# Patient Record
Sex: FEMALE | Race: WHITE
[De-identification: names, ages, dates, MRNs, and addresses within clinical notes are randomized per-mention and may not be internally consistent; named-entity substitution may affect disease eponyms.]

---

## 2018-03-30 ENCOUNTER — HOSPITAL ENCOUNTER (EMERGENCY)
Dept: HOSPITAL 91 - E/R | Age: 83
Discharge: HOME | End: 2018-03-30
Payer: COMMERCIAL

## 2018-03-30 ENCOUNTER — HOSPITAL ENCOUNTER (EMERGENCY)
Age: 83
Discharge: HOME | End: 2018-03-30

## 2018-03-30 DIAGNOSIS — I10: ICD-10-CM

## 2018-03-30 DIAGNOSIS — E66.9: ICD-10-CM

## 2018-03-30 DIAGNOSIS — M54.32: Primary | ICD-10-CM

## 2018-03-30 PROCEDURE — 96372 THER/PROPH/DIAG INJ SC/IM: CPT

## 2018-03-30 PROCEDURE — 99284 EMERGENCY DEPT VISIT MOD MDM: CPT

## 2018-03-30 RX ADMIN — KETOROLAC TROMETHAMINE 1 MG: 30 INJECTION, SOLUTION INTRAMUSCULAR at 17:56

## 2018-03-30 RX ADMIN — ALPRAZOLAM 1 MG: 1 TABLET ORAL at 18:01

## 2019-03-19 ENCOUNTER — HOSPITAL ENCOUNTER (EMERGENCY)
Dept: HOSPITAL 10 - E/R | Age: 84
Discharge: HOME | End: 2019-03-19
Payer: COMMERCIAL

## 2019-03-19 ENCOUNTER — HOSPITAL ENCOUNTER (EMERGENCY)
Dept: HOSPITAL 91 - E/R | Age: 84
Discharge: HOME | End: 2019-03-19
Payer: COMMERCIAL

## 2019-03-19 VITALS
HEIGHT: 62 IN | BODY MASS INDEX: 24.34 KG/M2 | HEIGHT: 62 IN | WEIGHT: 132.28 LBS | BODY MASS INDEX: 24.34 KG/M2 | WEIGHT: 132.28 LBS

## 2019-03-19 VITALS — HEART RATE: 68 BPM | DIASTOLIC BLOOD PRESSURE: 69 MMHG | SYSTOLIC BLOOD PRESSURE: 139 MMHG | RESPIRATION RATE: 18 BRPM

## 2019-03-19 DIAGNOSIS — M54.2: Primary | ICD-10-CM

## 2019-03-19 DIAGNOSIS — R94.02: ICD-10-CM

## 2019-03-19 DIAGNOSIS — I10: ICD-10-CM

## 2019-03-19 LAB
ADD MAN DIFF?: NO
ANION GAP: 10 (ref 5–13)
BASOPHIL #: 0 10^3/UL (ref 0–0.1)
BASOPHILS %: 0.4 % (ref 0–2)
BLOOD UREA NITROGEN: 15 MG/DL (ref 7–20)
CALCIUM: 10.8 MG/DL (ref 8.4–10.2)
CARBON DIOXIDE: 31 MMOL/L (ref 21–31)
CHLORIDE: 96 MMOL/L (ref 97–110)
CREATININE: 0.83 MG/DL (ref 0.44–1)
EOSINOPHILS #: 0.1 10^3/UL (ref 0–0.5)
EOSINOPHILS %: 1.2 % (ref 0–7)
GLUCOSE: 108 MG/DL (ref 70–220)
HEMATOCRIT: 45.9 % (ref 37–47)
HEMOGLOBIN: 14.1 G/DL (ref 12–16)
IMMATURE GRANS #M: 0.01 10^3/UL (ref 0–0.03)
IMMATURE GRANS % (M): 0.2 % (ref 0–0.43)
LYMPHOCYTES #: 2.1 10^3/UL (ref 0.8–2.9)
LYMPHOCYTES %: 43.1 % (ref 15–51)
MEAN CORPUSCULAR HEMOGLOBIN: 26.4 PG (ref 29–33)
MEAN CORPUSCULAR HGB CONC: 30.7 G/DL (ref 32–37)
MEAN CORPUSCULAR VOLUME: 86 FL (ref 82–101)
MEAN PLATELET VOLUME: 9.5 FL (ref 7.4–10.4)
MONOCYTE #: 0.5 10^3/UL (ref 0.3–0.9)
MONOCYTES %: 9.6 % (ref 0–11)
NEUTROPHIL #: 2.2 10^3/UL (ref 1.6–7.5)
NEUTROPHILS %: 45.5 % (ref 39–77)
NUCLEATED RED BLOOD CELLS #: 0 10^3/UL (ref 0–0)
NUCLEATED RED BLOOD CELLS%: 0 /100WBC (ref 0–0)
PLATELET COUNT: 227 10^3/UL (ref 140–415)
POTASSIUM: 3.9 MMOL/L (ref 3.5–5.1)
RED BLOOD COUNT: 5.34 10^6/UL (ref 4.2–5.4)
RED CELL DISTRIBUTION WIDTH: 13.9 % (ref 11.5–14.5)
SODIUM: 137 MMOL/L (ref 135–144)
TROPONIN-I: < 0.012 NG/ML (ref 0–0.12)
WHITE BLOOD COUNT: 4.9 10^3/UL (ref 4.8–10.8)

## 2019-03-19 PROCEDURE — 96374 THER/PROPH/DIAG INJ IV PUSH: CPT

## 2019-03-19 PROCEDURE — 36415 COLL VENOUS BLD VENIPUNCTURE: CPT

## 2019-03-19 PROCEDURE — 93005 ELECTROCARDIOGRAM TRACING: CPT

## 2019-03-19 PROCEDURE — 80048 BASIC METABOLIC PNL TOTAL CA: CPT

## 2019-03-19 PROCEDURE — 84484 ASSAY OF TROPONIN QUANT: CPT

## 2019-03-19 PROCEDURE — 93880 EXTRACRANIAL BILAT STUDY: CPT

## 2019-03-19 PROCEDURE — 70450 CT HEAD/BRAIN W/O DYE: CPT

## 2019-03-19 PROCEDURE — 85025 COMPLETE CBC W/AUTO DIFF WBC: CPT

## 2019-03-19 PROCEDURE — 99285 EMERGENCY DEPT VISIT HI MDM: CPT

## 2019-03-19 RX ADMIN — KETOROLAC TROMETHAMINE 1 MG: 30 INJECTION, SOLUTION INTRAMUSCULAR at 20:22

## 2019-03-19 NOTE — ERD
ER Documentation


Chief Complaint


Chief Complaint





RIGHT SIDED NECK PAIN X 2 WEEKS.





HPI


Patient is an 89-year-old female with hypertension who presents with neck pain. 


The patient has left-sided neck pain that is worse with range of motion.  She 


has had the symptoms for the past 2 weeks.  She tried ibuprofen and Tylenol.  


She has no fevers.  Upon review of old medical records this is the patient's 


third visit to the ER since 2014.  She does have a primary doctor.





ROS


All systems reviewed and are negative except as per history of present illness.





Medications


Home Meds


Active Scripts


Tramadol HCl (Tramadol HCl) 50 Mg Tablet, 50 MG PO Q6 PRN for PAIN, #20 TAB


   Prov:KUSH CALDERON MD         3/19/19


Reported Medications


Calcium Carbonate (CALCIUM) Unknown Strength Tablet, 1 TAB PO DAILY, TAB


   3/19/19


Folic Acid/Mv,Fe,Other Min (Centrum Complete Multivit Tab) 1 Each Tablet, 1 EACH


PO DAILY, TAB


   3/19/19


Famotidine* (Famotidine*) 20 Mg Tablet, 20 MG PO DAILY, #30 TAB


   3/19/19


Lisinopril* (Lisinopril*) 30 Mg Tablet, 30 MG PO DAILY, #30 TAB


   3/19/19


Amlodipine Besylate* (Amlodipine Besylate*) 10 Mg Tablet, 10 MG PO DAILY, #30 


TAB


   3/19/19


Alendronate Sodium* (Alendronate Sodium*) 35 Mg Tablet, 35 MG PO Q MON, #4 TAB


   3/19/19


Discontinued Reported Medications


Alendronate Sodium* (Fosamax*) 70 Mg Tablet, 70 MG PO QMOND, #4 TAB


   3/30/18


Amlodipine Besylate* (Amlodipine Besylate*) 10 Mg Tablet, 10 MG PO DAILY, #30 


TAB


   3/30/18


Lisinopril* (Lisinopril*) 30 Mg Tablet, 30 MG PO DAILY, #30 TAB


   3/30/18


Discontinued Scripts


Ibuprofen* (Ibuprofen*) 600 Mg Tablet, 600 MG PO Q8 for PAIN AND/OR 


INFLAMMATION, #60 TAB


   Prov:ROSALES CHAPA MD         3/30/18


Alprazolam* (Xanax*) 1 Mg Tab, 1 MG PO TID for PAIN AND/OR INFLAMMATION, #12 TAB


   Prov:ROSALES CHAPA MD         3/30/18





Allergies


Allergies:  


Coded Allergies:  


     No Known Allergy (Unverified , 3/19/19)





PMhx/Soc


History of Surgery:  Yes (eye sx)


Anesthesia Reaction:  No


Hx Neurological Disorder:  No


Hx Respiratory Disorders:  No


Hx Cardiac Disorders:  Yes (HTN)


Hx Psychiatric Problems:  No


Hx Miscellaneous Medical Probl:  Yes (glaucoma)


Hx Alcohol Use:  No


Hx Substance Use:  No


Hx Tobacco Use:  No


Smoking Status:  Never smoker





FmHx


Family History:  No diabetes





Physical Exam


Vitals





Vital Signs


  Date      Temp  Pulse  Resp  B/P (MAP)   Pulse Ox  O2          O2 Flow    FiO2


Time                                                 Delivery    Rate


   3/19/19  98.1     68    18      139/69        96


     22:01                           (92)


   3/19/19  98.1     70    20     150/105       100


     21:05                          (120)


   3/19/19  98.1     68    20      165/90       100


     20:02                          (115)


   3/19/19  98.1     68    20      165/90       100


     20:02                          (115)


   3/19/19  99.0     89    18     189/100        95


     15:51                          (129)





Physical Exam


Const:   No acute distress


Head:   Atraumatic 


Eyes:    Normal Conjunctiva


ENT:    Normal External Ears, Nose and Mouth.


Neck:             Pain over the strap muscles of the left neck or pain with 


range of motion to the left


Resp:   Clear to auscultation bilaterally


Cardio:   Regular rate and rhythm, no murmurs


Abd:    Soft, non tender, non distended. Normal bowel sounds


Skin:   No petechiae or rashes


Back:   No midline or flank tenderness


Ext:    No cyanosis, or edema


Neur:   Awake and alert


Psych:    Normal Mood and Affect


Result Diagram:  


3/19/19 2015                                                                    


           3/19/19 2015





Results 24 hrs





Laboratory Tests


              Test
                                 3/19/19
20:15


              White Blood Count                      4.9 10^3/ul


              Red Blood Count                       5.34 10^6/ul


              Hemoglobin                               14.1 g/dl


              Hematocrit                                  45.9 %


              Mean Corpuscular Volume                    86.0 fl


              Mean Corpuscular Hemoglobin                26.4 pg


              Mean Corpuscular Hemoglobin
Concent     30.7 g/dl 



              Red Cell Distribution Width                 13.9 %


              Platelet Count                         227 10^3/UL


              Mean Platelet Volume                        9.5 fl


              Immature Granulocytes %                    0.200 %


              Neutrophils %                               45.5 %


              Lymphocytes %                               43.1 %


              Monocytes %                                  9.6 %


              Eosinophils %                                1.2 %


              Basophils %                                  0.4 %


              Nucleated Red Blood Cells %            0.0 /100WBC


              Immature Granulocytes #              0.010 10^3/ul


              Neutrophils #                          2.2 10^3/ul


              Lymphocytes #                          2.1 10^3/ul


              Monocytes #                            0.5 10^3/ul


              Eosinophils #                          0.1 10^3/ul


              Basophils #                            0.0 10^3/ul


              Nucleated Red Blood Cells #            0.0 10^3/ul


              Sodium Level                            137 mmol/L


              Potassium Level                         3.9 mmol/L


              Chloride Level                           96 mmol/L


              Carbon Dioxide Level                     31 mmol/L


              Anion Gap                                       10


              Blood Urea Nitrogen                       15 mg/dl


              Creatinine                              0.83 mg/dl


              Est Glomerular Filtrat Rate
mL/min    mL/min 



              Glucose Level                            108 mg/dl


              Calcium Level                           10.8 mg/dl


              Troponin I                           < 0.012 ng/ml





Current Medications


 Medications
   Dose
          Sig/Verna
       Start Time
   Status  Last


 (Trade)       Ordered        Route
 PRN     Stop Time              Admin
Dose


                              Reason                                Admin


 Ketorolac
     30 mg          ONCE  STAT
    3/19/19       DC           3/19/19


Tromethamine
                 IV
            20:09
                       20:22



 (Toradol)                                   3/19/19 20:14








Procedures/MDM


EKG read by me: 


Rate/Rhythm: Regular rate and rhythm at a rate of 67


Intervals:    Normal


Impression:     No evidence of ischemia or arrhythmia





CT head read by radiology.  Ultrasound of the carotids showed no stenosis per 


radiology.





Patient is an 89-year-old female who presents with left-sided neck pain.  CT 


head was negative.  Ultrasound of the carotids shows no stenosis.  EKG was 


normal.  Laboratory studies are basically normal.  At this point I believe 


outpatient management is appropriate.  I doubt serious etiology such as stroke 


or carotid stenosis.  I doubt acute coronary syndrome.  I believe outpatient 


management is appropriate but the patient will need close follow-up with primary


doctor within 24-48 hours.  The patient can return for any worsening symptoms.  


The patient will be given tramadol for pain.





Departure


Diagnosis:  


   Primary Impression:  


   Neck pain


Condition:  Fair


Patient Instructions:  Neck Pain, No Trauma


Referrals:  


Your doctor





Additional Instructions:  


Call your primary care doctor TOMORROW for an appointment during the next 1-2 


days.See the doctor sooner or return here if your condition worsens before your 


appointment time.











KUSH CALDERON MD                Mar 19, 2019 21:50